# Patient Record
Sex: MALE | Race: AMERICAN INDIAN OR ALASKA NATIVE | NOT HISPANIC OR LATINO | URBAN - METROPOLITAN AREA
[De-identification: names, ages, dates, MRNs, and addresses within clinical notes are randomized per-mention and may not be internally consistent; named-entity substitution may affect disease eponyms.]

---

## 2020-07-30 ENCOUNTER — EMERGENCY (EMERGENCY)
Facility: HOSPITAL | Age: 30
LOS: 1 days | Discharge: NURSING, REHAB OR LONG-TERM CARE | End: 2020-07-30
Attending: EMERGENCY MEDICINE | Admitting: EMERGENCY MEDICINE
Payer: SELF-PAY

## 2020-07-30 ENCOUNTER — EMERGENCY (EMERGENCY)
Facility: HOSPITAL | Age: 30
LOS: 1 days | Discharge: HOME | End: 2020-07-30
Attending: EMERGENCY MEDICINE
Payer: SELF-PAY

## 2020-07-30 VITALS
SYSTOLIC BLOOD PRESSURE: 130 MMHG | WEIGHT: 139.99 LBS | HEIGHT: 65 IN | HEART RATE: 88 BPM | DIASTOLIC BLOOD PRESSURE: 58 MMHG | OXYGEN SATURATION: 99 % | RESPIRATION RATE: 16 BRPM | TEMPERATURE: 98 F

## 2020-07-30 VITALS
DIASTOLIC BLOOD PRESSURE: 80 MMHG | SYSTOLIC BLOOD PRESSURE: 120 MMHG | RESPIRATION RATE: 19 BRPM | OXYGEN SATURATION: 100 % | HEART RATE: 92 BPM

## 2020-07-30 VITALS
DIASTOLIC BLOOD PRESSURE: 68 MMHG | HEART RATE: 87 BPM | RESPIRATION RATE: 16 BRPM | SYSTOLIC BLOOD PRESSURE: 124 MMHG | TEMPERATURE: 98 F | OXYGEN SATURATION: 100 %

## 2020-07-30 VITALS
SYSTOLIC BLOOD PRESSURE: 117 MMHG | RESPIRATION RATE: 18 BRPM | OXYGEN SATURATION: 100 % | TEMPERATURE: 99 F | DIASTOLIC BLOOD PRESSURE: 65 MMHG | HEART RATE: 107 BPM

## 2020-07-30 LAB
ALBUMIN SERPL ELPH-MCNC: 3.8 G/DL — SIGNIFICANT CHANGE UP (ref 3.3–5)
ALP SERPL-CCNC: 79 U/L — SIGNIFICANT CHANGE UP (ref 40–120)
ALT FLD-CCNC: 42 U/L — HIGH (ref 4–41)
AMORPH CRY # UR COMP ASSIST: SIGNIFICANT CHANGE UP (ref 0–0)
ANION GAP SERPL CALC-SCNC: 11 MMO/L — SIGNIFICANT CHANGE UP (ref 7–14)
APPEARANCE UR: SIGNIFICANT CHANGE UP
AST SERPL-CCNC: 30 U/L — SIGNIFICANT CHANGE UP (ref 4–40)
BACTERIA # UR AUTO: HIGH
BASOPHILS # BLD AUTO: 0.06 K/UL — SIGNIFICANT CHANGE UP (ref 0–0.2)
BASOPHILS NFR BLD AUTO: 0.5 % — SIGNIFICANT CHANGE UP (ref 0–2)
BILIRUB SERPL-MCNC: 0.5 MG/DL — SIGNIFICANT CHANGE UP (ref 0.2–1.2)
BILIRUB UR-MCNC: NEGATIVE — SIGNIFICANT CHANGE UP
BLOOD UR QL VISUAL: NEGATIVE — SIGNIFICANT CHANGE UP
BUN SERPL-MCNC: 16 MG/DL — SIGNIFICANT CHANGE UP (ref 7–23)
CALCIUM SERPL-MCNC: 9.3 MG/DL — SIGNIFICANT CHANGE UP (ref 8.4–10.5)
CHLORIDE SERPL-SCNC: 99 MMOL/L — SIGNIFICANT CHANGE UP (ref 98–107)
CO2 SERPL-SCNC: 27 MMOL/L — SIGNIFICANT CHANGE UP (ref 22–31)
COLOR SPEC: YELLOW — SIGNIFICANT CHANGE UP
CREAT SERPL-MCNC: 0.92 MG/DL — SIGNIFICANT CHANGE UP (ref 0.5–1.3)
EOSINOPHIL # BLD AUTO: 0.37 K/UL — SIGNIFICANT CHANGE UP (ref 0–0.5)
EOSINOPHIL NFR BLD AUTO: 3 % — SIGNIFICANT CHANGE UP (ref 0–6)
GLUCOSE SERPL-MCNC: 89 MG/DL — SIGNIFICANT CHANGE UP (ref 70–99)
GLUCOSE UR-MCNC: NEGATIVE — SIGNIFICANT CHANGE UP
HCT VFR BLD CALC: 28.7 % — LOW (ref 39–50)
HGB BLD-MCNC: 9 G/DL — LOW (ref 13–17)
IMM GRANULOCYTES NFR BLD AUTO: 2.9 % — HIGH (ref 0–1.5)
KETONES UR-MCNC: NEGATIVE — SIGNIFICANT CHANGE UP
LEUKOCYTE ESTERASE UR-ACNC: NEGATIVE — SIGNIFICANT CHANGE UP
LYMPHOCYTES # BLD AUTO: 1.51 K/UL — SIGNIFICANT CHANGE UP (ref 1–3.3)
LYMPHOCYTES # BLD AUTO: 12.3 % — LOW (ref 13–44)
MCHC RBC-ENTMCNC: 28.8 PG — SIGNIFICANT CHANGE UP (ref 27–34)
MCHC RBC-ENTMCNC: 31.4 % — LOW (ref 32–36)
MCV RBC AUTO: 92 FL — SIGNIFICANT CHANGE UP (ref 80–100)
MONOCYTES # BLD AUTO: 0.91 K/UL — HIGH (ref 0–0.9)
MONOCYTES NFR BLD AUTO: 7.4 % — SIGNIFICANT CHANGE UP (ref 2–14)
NEUTROPHILS # BLD AUTO: 9.11 K/UL — HIGH (ref 1.8–7.4)
NEUTROPHILS NFR BLD AUTO: 73.9 % — SIGNIFICANT CHANGE UP (ref 43–77)
NITRITE UR-MCNC: NEGATIVE — SIGNIFICANT CHANGE UP
NRBC # FLD: 0 K/UL — SIGNIFICANT CHANGE UP (ref 0–0)
PH UR: 7 — SIGNIFICANT CHANGE UP (ref 5–8)
PLATELET # BLD AUTO: 580 K/UL — HIGH (ref 150–400)
PMV BLD: 8.6 FL — SIGNIFICANT CHANGE UP (ref 7–13)
POTASSIUM SERPL-MCNC: 4.7 MMOL/L — SIGNIFICANT CHANGE UP (ref 3.5–5.3)
POTASSIUM SERPL-SCNC: 4.7 MMOL/L — SIGNIFICANT CHANGE UP (ref 3.5–5.3)
PROT SERPL-MCNC: 7.4 G/DL — SIGNIFICANT CHANGE UP (ref 6–8.3)
PROT UR-MCNC: NEGATIVE — SIGNIFICANT CHANGE UP
RBC # BLD: 3.12 M/UL — LOW (ref 4.2–5.8)
RBC # FLD: 13.3 % — SIGNIFICANT CHANGE UP (ref 10.3–14.5)
SODIUM SERPL-SCNC: 137 MMOL/L — SIGNIFICANT CHANGE UP (ref 135–145)
SP GR SPEC: 1.02 — SIGNIFICANT CHANGE UP (ref 1–1.04)
UROBILINOGEN FLD QL: NORMAL — SIGNIFICANT CHANGE UP
WBC # BLD: 12.32 K/UL — HIGH (ref 3.8–10.5)
WBC # FLD AUTO: 12.32 K/UL — HIGH (ref 3.8–10.5)

## 2020-07-30 PROCEDURE — 99284 EMERGENCY DEPT VISIT MOD MDM: CPT | Mod: 25

## 2020-07-30 PROCEDURE — 99291 CRITICAL CARE FIRST HOUR: CPT

## 2020-07-30 PROCEDURE — 70450 CT HEAD/BRAIN W/O DYE: CPT | Mod: 26

## 2020-07-30 PROCEDURE — 99284 EMERGENCY DEPT VISIT MOD MDM: CPT

## 2020-07-30 PROCEDURE — 73090 X-RAY EXAM OF FOREARM: CPT | Mod: 26,LT

## 2020-07-30 PROCEDURE — 73130 X-RAY EXAM OF HAND: CPT | Mod: 26,RT

## 2020-07-30 PROCEDURE — 99285 EMERGENCY DEPT VISIT HI MDM: CPT

## 2020-07-30 PROCEDURE — 72170 X-RAY EXAM OF PELVIS: CPT | Mod: 26

## 2020-07-30 PROCEDURE — 71045 X-RAY EXAM CHEST 1 VIEW: CPT | Mod: 26

## 2020-07-30 PROCEDURE — 93005 ELECTROCARDIOGRAM TRACING: CPT

## 2020-07-30 PROCEDURE — 70450 CT HEAD/BRAIN W/O DYE: CPT | Mod: 26,77

## 2020-07-30 PROCEDURE — 70450 CT HEAD/BRAIN W/O DYE: CPT

## 2020-07-30 PROCEDURE — 93010 ELECTROCARDIOGRAM REPORT: CPT

## 2020-07-30 RX ORDER — SODIUM CHLORIDE 9 MG/ML
1000 INJECTION, SOLUTION INTRAVENOUS ONCE
Refills: 0 | Status: COMPLETED | OUTPATIENT
Start: 2020-07-30 | End: 2020-07-30

## 2020-07-30 RX ORDER — METOCLOPRAMIDE HCL 10 MG
10 TABLET ORAL ONCE
Refills: 0 | Status: COMPLETED | OUTPATIENT
Start: 2020-07-30 | End: 2020-07-30

## 2020-07-30 RX ORDER — ACETAMINOPHEN 500 MG
650 TABLET ORAL ONCE
Refills: 0 | Status: COMPLETED | OUTPATIENT
Start: 2020-07-30 | End: 2020-07-30

## 2020-07-30 RX ADMIN — Medication 10 MILLIGRAM(S): at 14:46

## 2020-07-30 RX ADMIN — Medication 650 MILLIGRAM(S): at 17:11

## 2020-07-30 RX ADMIN — Medication 650 MILLIGRAM(S): at 12:47

## 2020-07-30 RX ADMIN — SODIUM CHLORIDE 1000 MILLILITER(S): 9 INJECTION, SOLUTION INTRAVENOUS at 17:11

## 2020-07-30 RX ADMIN — SODIUM CHLORIDE 1000 MILLILITER(S): 9 INJECTION, SOLUTION INTRAVENOUS at 12:47
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
## 2020-07-30 NOTE — ED PROVIDER NOTE - PROGRESS NOTE DETAILS
Repeat CT with Acute epidural hemorrhage in the right temporal region as described above, unchanged since previous exam. Acute minimally displaced fracture involving the right frontal, temporal, zygomatic arch and greater wing of the sphenoid bones. Pt cleared by Neurosurgery for discharge.
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
## 2020-07-30 NOTE — ED ADULT NURSE REASSESSMENT NOTE - NS ED NURSE REASSESS COMMENT FT1
Pt to be transferred from results/waiting to main E.D. for further observation. Report given to reuben Linton. Pt currently sitting up on cell phone, denies distress at this time, a&ox4, neg SOB, neg chest pain, IV intact, IV fluids running as per orders. Neurology recently at bedside consulting with patient.

## 2020-07-30 NOTE — ED PROVIDER NOTE - PATIENT PORTAL LINK FT
You can access the FollowMyHealth Patient Portal offered by [facility] by registering at the following website: [email or web address]. By joining [employer]’s FollowMyHealth portal, you will also be able to view your health information using other applications (apps) compatible with our system.
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
## 2020-07-30 NOTE — ED PROVIDER NOTE - PHYSICAL EXAMINATION
Head: Skull with no deformity and is nontender  Skin: Lower abdomen under the umbilical with 10cm suture line with staples, wound is clean, dry, and intact. Scattered abrasion to bilateral legs. Left wrist with 6cm vertical incision with staples, no bleeding, wound is clean and dry. No sutures in right pinky. Left 4th digit with sutures dorsally at the PIP about 6-8 sutures. Middle finger with a few sutures as well, sutures are clean dry and intact. No sutures visible at right 3rd toe. Head: Skull with no deformity and is nontender  Skin: Lower abdomen under the umbilical with 10cm suture line with staples, wound is clean, dry, and intact. Scattered abrasion to bilateral legs, healing and no sign of infection. Left wrist with 6cm vertical incision with staples, no bleeding, wound is clean and dry. No sutures in right pinky. Left 4th digit with sutures dorsally at the PIP about 6-8 sutures. Middle finger with a few sutures as well, sutures are clean dry and intact. No sutures visible at right 3rd toe.  All extrems distal NVT intact no sign of infection.
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
## 2020-07-30 NOTE — ED PROVIDER NOTE - CARE PLAN
Principal Discharge DX:	Epidural hemorrhage  Assessment and plan of treatment:	Acute epidural hemorrhage in the right temporal region as described above, unchanged since previous exam. Acute minimally displaced fracture involving the right frontal, temporal, zygomatic arch and greater wing of the sphenoid bones. Bleed is stable from prior CT. Plan for outpatient follow up.  Secondary Diagnosis:	Skull fracture
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
## 2020-07-30 NOTE — ED ADULT NURSE REASSESSMENT NOTE - NS ED NURSE REASSESS COMMENT FT1
received pt in bed A and Ox 3  in NAD PERRLA extremities are strong and equal B/L. pt denies sensory deficit, reports HA improved slightly, denies N/V changes in vision. pt placed on monitor with VSS, report given to transfer dispatch. pending ETA. pt sent to X ray for further studies.

## 2020-07-30 NOTE — ED PROVIDER NOTE - PLAN OF CARE
Acute epidural hemorrhage in the right temporal region as described above, unchanged since previous exam. Acute minimally displaced fracture involving the right frontal, temporal, zygomatic arch and greater wing of the sphenoid bones. Bleed is stable from prior CT. Plan for outpatient follow up.

## 2020-07-30 NOTE — ED ADULT TRIAGE NOTE - PATIENT ON (OXYGEN DELIVERY METHOD)
[FreeTextEntry1] : This is an 82 year old male with history of HLD, carotid artery disease, HTN, TIA presents for follow up visit. He is uncertain on how he should be taking some of his medications. He is taking Aspirin 81mg two times a week and Plavix 75mg 2 times a week. He is taking rosuvastatin 5mg 0.5tab per night and Toprol XL 25mg 0.5 tab daily. He feels great. He has no chest pain, SOB, or palpitations.  room air

## 2020-07-30 NOTE — ED PROVIDER NOTE - PHYSICAL EXAMINATION
GENl: Patient awake alert NAD.   HEENT: normocephalic, atraumatic, no aparicio sign, EOMI, pupils 3mm b/l PERRL,no scleral icterus, moist MM  CARDIAC: RRR, S1, S2, no murmur.   PULM: CTA B/L no wheeze, rhonchi, rales.   ABD: + clean dry healing incision. soft NT, ND, no rebound no guarding.   MSK: Moving all extremities, no edema.   NEURO: A&Ox3, normal gait, no focal neurological deficits, CN 2-12 grossly intact  SKIN: + clean dry healing incision on left wrist, right hand, abdomen, otherwise. warm, dry, no rash.
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
## 2020-07-30 NOTE — ED PROVIDER NOTE - NSFOLLOWUPCLINICS_GEN_ALL_ED_FT
[facility]  Plastic & Reconstructive Surgery  [address] [place] [address]  Phone: [phone]  Fax:   Follow Up Time: 1-3 Days    [facility] [place]  Plastic Surgery  [address] [place] [id number]  Phone: [phone]  Fax:   Follow Up Time: 1-3 Days
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
## 2020-07-30 NOTE — ED PROVIDER NOTE - NEUROLOGICAL, MLM
Alert and oriented, no focal deficits, no motor or sensory deficits. Speech is fluent. Patient is distally NVI.

## 2020-07-30 NOTE — ED PROVIDER NOTE - NSFOLLOWUPINSTRUCTIONS_ED_ALL_ED_FT
You were seen in the Emergency Department (ED) for headache. You were found to have a stable epidural hematoma. Please follow up with plastic surgery for your displaced skull fracture (please call to make an appointment). Please follow up [facility] for your other surgeries.     Please take over the counter Tylenol or Ibuprofen as directed by packaging for your pain.     If you have issues obtaining follow up, please call: [phone] to obtain a doctor or specialist who takes your insurance in your area.    Please return to the ED if you experience any new or concerning symptoms, such as: worsening headache pain, chest pain, difficulty breathing, passing out, unable to eat or drink, redness and draining from your surgical wounds, fever, chills.     Thank you for visiting a [facility] ED.

## 2020-07-31 LAB — SARS-COV-2 RNA SPEC QL NAA+PROBE: SIGNIFICANT CHANGE UP

## 2022-01-09 ENCOUNTER — OUTPATIENT (OUTPATIENT)
Dept: OUTPATIENT SERVICES | Facility: HOSPITAL | Age: 32
LOS: 1 days | End: 2022-01-09

## 2022-01-09 DIAGNOSIS — Z20.822 CONTACT WITH AND (SUSPECTED) EXPOSURE TO COVID-19: ICD-10-CM

## 2022-01-10 PROBLEM — Z78.9 OTHER SPECIFIED HEALTH STATUS: Chronic | Status: ACTIVE | Noted: 2020-07-31

## 2022-01-10 LAB — SARS-COV-2 RNA SPEC QL NAA+PROBE: SIGNIFICANT CHANGE UP

## 2024-09-17 NOTE — ED PROVIDER NOTE - CARDIAC HEART SOUNDS
[Fall prevention counseling provided] : Fall prevention counseling provided [Adequate lighting] : Adequate lighting [No throw rugs] : No throw rugs [Use proper foot wear] : Use proper foot wear [Use recommended devices] : Use recommended devices [Sleep ___ hours/day] : Sleep [unfilled] hours/day [None] : None [Behavioral health counseling provided] : Behavioral health counseling provided [Engage in a relaxing activity] : Engage in a relaxing activity [Cessation strategies including cessation program discussed] : Cessation strategies including cessation program discussed [Use of nicotine replacement therapies and other medications discussed] : Use of nicotine replacement therapies and other medications discussed [Encouraged to pick a quit date and identify support needed to quit] : Encouraged to pick a quit date and identify support needed to quit [Smoking Cessation Program Referral] : Smoking Cessation Program Referral  [Yes] : Willing to quit smoking [Potential consequences of obesity discussed] : Potential consequences of obesity discussed [Benefits of weight loss discussed] : Benefits of weight loss discussed [Structured Weight Management Program suggested:] : Structured weight management program suggested [Encouraged to maintain food diary] : Encouraged to maintain food diary [Encouraged to increase physical activity] : Encouraged to increase physical activity [Encouraged to use exercise tracking device] : Encouraged to use exercise tracking device [Target Wt Loss Goal ___] : Weight Loss Goals: Target weight loss goal [unfilled] lbs [Weigh Self Weekly] : weigh self weekly [Decrease Portions] : decrease portions [____ min/wk Activity] : [unfilled] min/wk activity [Keep Food Diary] : keep food diary [Good understanding] : Patient has a good understanding of disease, goals and obesity follow-up plan [FreeTextEntry1] : 3 [de-identified] : Medical Annual wellness visit completed:\par  HRA completed and reviewed with patient\par  Medical, family, surgical history reviewed with patient and updated\par  List of current providers r/w patient and updated\par  Vitals, BMI reviewed and discussed along with healthy BMI goals. Dietary counseling x 15 minutes provided\par  Depression PHQ 9 completed and reviewed \par  Annual safety assessment reviewed\par  discussed advanced directives\par  smoking cessation counseling provided\par  Established routine screening and immunization schedules\par  Medical Annual wellness visit completed:\par  HRA completed and reviewed with patient\par  Medical, family, surgical history reviewed with patient and updated\par  List of current providers r/w patient and updated\par  Vitals, BMI reviewed and discussed along with healthy BMI goals. Dietary counseling x 15 minutes provided\par  Depression PHQ 9 completed and reviewed \par  Annual safety assessment reviewed\par  discussed advanced directives\par  smoking cessation counseling provided\par  Established routine screening and immunization schedules\par  \par  VACCINATION & OTHER TX RECOMMENDATIONS\par  \par  ASA preventative therapy\par  Calcium/Vitamin D supplementation\par   \par  Dietary counseling, nutrition referral\par  risks vs. benefits d/w patient. routine vaccination and vaccination schedules and recommendation d/w patient\par  \par  Vaccines recommended: \par  * pneumovax (once after 65) & prevnar\par  * annual Influenza vaccine\par  * Hep B vaccines\par  * zostavax\par  * Tdap\par  \par  Colorectal screening recommended; screening colonoscopy q10yr, flex sig q5yr, annual fecal occult testing\par  BMD recommended biennially for osteoporosis screening\par  Glaucoma screening recommended, annual optho evals\par  Cardiovascular screening and blood tests recommended and discussed w/ patient, cholesterol screening and dietary counseling\par  AAA recommended x 1\par  \par  DIABETIC recommendations:\par  med nutrition counseling, nutrition referral \par  annual podiatry evaluations and follow up\par  annual optho eval/retinal exams\par  routine blood tets, including FBS, a1c% and cholesterol panels\par  \par  Met with TANNER IVEY, who was willing to discuss advance care planning. \par  Our advance care planning conversation included a discussion about:\par  1. The value and importance of advance care planning.\par  2. Experiences with loved ones who have been seriously ill or have .\par  3. Exploration of personal, cultural, or spiritual beliefs that might influence medical decisions.\par  4. Exploration of goals of care in the event of a sudden injury or illness.\par  5. Identification of a health care agent.\par  6. Review and update, or completion of, an advance directive.\par  Start time: ______1:30______                    End time: _____1:45_______\par  \par  Avoid sweets and sugary drinks, diet drinks or water preferred, minimize carbohydrates such as bread, rice, pasta and potatoes\par  \par  \par  - Discussed diabetes physiology\par  - Discussed importance of monitoring blood glucose levels\par  - Encouraged a low fat/low cholesterol diet\par  - Discussed symptoms of hyperglycemia and hypoglycemia\par  - Discussed ADA glucose goals\par  - Discussed  HGB A1c and the effects of blood glucose on the level\par  - Discussed Healthy eating, avoidance of concentrated sweets, and to include vegetables by at least 2 meals a day\par  - Discussed regular exercise\par  - Discussed importance of follow up physician visits\par  \par  Dscd.D/E wt.loss needs to loose 10% TBW.DSCD.self monitoring, goal setting,problem solving w-b mod.portion control, avoidence of skipping meals, high glycemic foods,snacking and mindless eating in front of the tv.Suggested use of small plates and not keepingall of the food on the dinner table and leaving it at the stove top.Pt was also told to calorie count and an marilyn was recommended DSCD exercising 20 minutes per day:dscd:med /pharmaco bariatric tx options.\par  \par   - Encouraged a low fat/low cholesterol diet\par   - Discussed Healthy eating, avoidance of concentrated sweets, and to include vegetables by at least 3 meals a day\par   - encouraged low glycemic fruits, grains and vegetables and a diet high in plant protein\par   - Discussed regular exercise\par   - Discussed importance of follow up physician visits\par  \par  Dscd.D/E wt.loss needs to loose 10% TBW.DSCD.self monitoring, goal setting,problem solving w-b mod.portion control, avoidence of skipping meals, high glycemic foods,snacking and mindless eating in front of the tv.Suggested use of small plates and not keepingall of the food on the dinner table and leaving it at the stove top.Pt was also told to calorie count and an marilyn was recommended DSCD exercising 20 minutes per day:dscd:med /pharmaco bariatric tx options.\par  \par   - Encouraged a low fat/low cholesterol diet\par   - Discussed Healthy eating, avoidance of concentrated sweets, and to include vegetables by at least 3 meals a day\par   - encouraged low glycemic fruits, grains and vegetables and a diet high in plant protein\par   - Discussed regular exercise\par   - Discussed importance of follow up physician visits\par  \par  Urged to stop smoking offered tx options w. nictine gum , patches, pharmacotherapy, accupunture, hypnosis, and b-mod , dscd risks of smoking.\par  \par  diet and exercise weight loss.  Low-salt low-fat ADA diet/ htn- Discussed diabetes physiology\par  - Discussed importance of monitoring blood glucose levels\par  - Encouraged a low fat/low cholesterol diet\par  - Discussed symptoms of hyperglycemia and hypoglycemia\par  - Discussed ADA glucose goals\par  - Discussed  HGB A1c and the effects of blood glucose on the level\par  - Discussed Healthy eating, avoidance of concentrated sweets, and to include vegetables by at least 2 meals a day\par  - Discussed regular exercise\par  - Discussed importance of follow up physician visits Limit intake of Sodium (Salt) to less than 2 grams a day to prevent fluid retention-swelling or worsening of symptoms. The importance of keeping the blood pressure at or below 130/80 to prevent stroke, heart attacks, kidney failure, blindness, and loss of limbs was  low chol diet. Avoid fried foods, red meat, butter, eggs, hard cheeses. Use canola or olive oil preferred. ::  was established in which goals would be set, monitoring would be done, and problem solving would also be addressed. The patient would be assisted using behavior change techniques, such as self-help and counseling through behavioral modification: Problem solving using hypnosis and positive medical reinforcement to achieve agreed-upon goals.\par  \par  \par  \par   S1-S2